# Patient Record
Sex: MALE | Race: WHITE | Employment: OTHER | URBAN - METROPOLITAN AREA
[De-identification: names, ages, dates, MRNs, and addresses within clinical notes are randomized per-mention and may not be internally consistent; named-entity substitution may affect disease eponyms.]

---

## 2018-01-31 ENCOUNTER — APPOINTMENT (OUTPATIENT)
Dept: GENERAL RADIOLOGY | Age: 23
End: 2018-01-31
Attending: PHYSICIAN ASSISTANT
Payer: OTHER GOVERNMENT

## 2018-01-31 ENCOUNTER — HOSPITAL ENCOUNTER (EMERGENCY)
Age: 23
Discharge: HOME | End: 2018-01-31
Attending: EMERGENCY MEDICINE
Payer: OTHER GOVERNMENT

## 2018-01-31 VITALS
HEIGHT: 65 IN | RESPIRATION RATE: 20 BRPM | HEART RATE: 58 BPM | TEMPERATURE: 97.2 F | DIASTOLIC BLOOD PRESSURE: 53 MMHG | WEIGHT: 140 LBS | BODY MASS INDEX: 23.32 KG/M2 | SYSTOLIC BLOOD PRESSURE: 115 MMHG

## 2018-01-31 DIAGNOSIS — M25.561 ACUTE PAIN OF RIGHT KNEE: Primary | ICD-10-CM

## 2018-01-31 PROCEDURE — 73564 X-RAY EXAM KNEE 4 OR MORE: CPT

## 2018-01-31 PROCEDURE — 99282 EMERGENCY DEPT VISIT SF MDM: CPT

## 2018-01-31 RX ORDER — IBUPROFEN 800 MG/1
800 TABLET ORAL
Qty: 20 TAB | Refills: 0 | Status: SHIPPED | OUTPATIENT
Start: 2018-01-31 | End: 2018-02-07

## 2018-01-31 NOTE — LETTER
[facility] [place] 
[facility] 
Sandeep Read [id number] 
[phone] Work/School Note Date: 1/31/2018 To Whom It May concern: 
 
Amberly Muñoz was seen and treated today in the emergency room by the following provider(s): 
Attending Provider: Tenzin Hernandez MD 
Physician Assistant: Lambert Moore. Raymond  Special Instructions:  Wear knee brace constantly for 1 week. Follow up with sick call in the morning. Sincerely, Padmaja Roblero PA-C

## 2018-02-01 NOTE — ED PROVIDER NOTES
EMERGENCY DEPARTMENT HISTORY AND PHYSICAL EXAM    Date: 1/31/2018  Patient Name: Dalton Wynn    History of Presenting Illness     Chief Complaint   Patient presents with    Knee Pain         History Provided By: Patient    Chief Complaint: Right knee pain  Duration: A few months   Timing:  Worsening  Location: Right knee  Quality: Aching  Severity: 5 out of 10  Associated Symptoms: denies any other associated signs or symptoms    Additional History (Context):   10:54 PM  Dalton Wynn is a 23 y.o. male with no PMHX who presents ambulatory to the emergency department C/O right knee pain (rated 5/10), onset a few months ago. Notes no other associated sxs. Pt reports pain has gotten worse with PT. Pt is ac[employer]ive duty [employer]. Pt denies fever, chills, knee swelling, or any other sxs or complaints. PCP: None        Past History     Past Medical History:  History reviewed. No pertinent past medical history. Past Surgical History:  History reviewed. No pertinent surgical history. Family History:  History reviewed. No pertinent family history. Social History:  Social History   Substance Use Topics    Smoking status: Current Some Day Smoker    Smokeless tobacco: Never Used    Alcohol use No       Allergies: Allergies   Allergen Reactions    Penicillins Hives         Review of Systems   Review of Systems   Constitutional: Negative for chills and fever. Musculoskeletal: Positive for arthralgias (right knee). Negative for joint swelling. All other systems reviewed and are negative. Physical Exam     Vitals:    01/31/18 2203 01/31/18 2207   BP: 115/53    Pulse: (!) 58    Resp: 20    Temp: 97.2 °F (36.2 °C)    Weight:  63.5 kg (140 lb)   Height:  5' 5\" (1.651 m)     Physical Exam   Constitutional: He is oriented to person, place, and time. Vital signs are normal. He appears well-developed and well-nourished. No distress. HENT:   Head: Normocephalic and atraumatic.    Eyes: Conjunctivae are normal.   Neck: Normal range of motion. Neck supple. Cardiovascular: Normal rate, regular rhythm and normal heart sounds. Pulmonary/Chest: Effort normal and breath sounds normal. No respiratory distress. Musculoskeletal: Normal range of motion. He exhibits tenderness. He exhibits no edema or deformity. Right knee: He exhibits normal range of motion, no swelling, no effusion, no ecchymosis, no deformity, no laceration, no erythema, normal alignment, no LCL laxity, normal patellar mobility, no bony tenderness, normal meniscus and no MCL laxity. Tenderness (generalized) found. No MCL, no LCL and no patellar tendon tenderness noted. Neurological: He is alert and oriented to person, place, and time. Skin: Skin is warm and dry. He is not diaphoretic. Psychiatric: He has a normal mood and affect. His behavior is normal.   Nursing note and vitals reviewed. Diagnostic Study Results     Labs -   No results found for this or any previous visit (from the past 12 hour(s)). Radiologic Studies -    XR KNEE RT MIN 4 V   Final Result   IMPRESSION:     Negative for acute fracture, dislocation, or significant degenerative changes. As read by the radiologist.     CT Results  (Last 48 hours)    None        CXR Results  (Last 48 hours)    None            Medical Decision Making   I am the first provider for this patient. I reviewed the vital signs, available nursing notes, past medical history, past surgical history, family history and social history. Vital Signs-Reviewed the patient's vital signs. Records Reviewed: Nursing Notes    Provider Notes (Medical Decision Making):     Procedures:  Procedures    ED Course:   10:54 PM   Initial assessment performed. The patients presenting problems have been discussed, and they are in agreement with the care plan formulated and outlined with them. I have encouraged them to ask questions as they arise throughout their visit.     Diagnosis and Disposition DISCHARGE NOTE:  11:10 PM  Raymond Costellos  results have been reviewed with him. He has been counseled regarding his diagnosis, treatment, and plan. He verbally conveys understanding and agreement of the signs, symptoms, diagnosis, treatment and prognosis and additionally agrees to follow up as discussed. He also agrees with the care-plan and conveys that all of his questions have been answered. I have also provided discharge instructions for him that include: educational information regarding their diagnosis and treatment, and list of reasons why they would want to return to the ED prior to their follow-up appointment, should his condition change. He has been provided with education for proper emergency department utilization. CLINICAL IMPRESSION:    1. Acute pain of right knee        PLAN:  1. D/C Home  2. Discharge Medication List as of 1/31/2018 11:07 PM      START taking these medications    Details   ibuprofen (MOTRIN) 800 mg tablet Take 1 Tab by mouth every six (6) hours as needed for Pain for up to 7 days. Indications: Pain, Print, Disp-20 Tab, R-0           3. Follow-up Information     Follow up With Details Comments Contact Info    [facility] Schedule an appointment as soon as possible for a visit in 2 days for PCP follow up [phone]    THE [facility] EMERGENCY DEPT  As needed, If symptoms worsen [address] [place] [id number] [phone]        _______________________________    Attestations: This note is prepared by Henrry Canales, acting as Scribe for Ras Lan PA-C. Ras Lan PA-C:  The scribe's documentation has been prepared under my direction and personally reviewed by me in its entirety.   I confirm that thenote above accurately reflects all work, treatment, procedures, and medical decision making performed by me.  _______________________________

## 2018-02-01 NOTE — DISCHARGE INSTRUCTIONS
Knee Pain or Injury: Care Instructions  Your Care Instructions    Injuries are a common cause of knee problems. Sudden (acute) injuries may be caused by a direct blow to the knee. They can also be caused by abnormal twisting, bending, or falling on the knee. Pain, bruising, or swelling may be severe, and may start within minutes of the injury. Overuse is another cause of knee pain. Other causes are climbing stairs, kneeling, and other activities that use the knee. Everyday wear and tear, especially as you get older, also can cause knee pain. Rest, along with home treatment, often relieves pain and allows your knee to heal. If you have a serious knee injury, you may need tests and treatment. Follow-up care is a key part of your treatment and safety. Be sure to make and go to all appointments, and call your doctor if you are having problems. It's also a good idea to know your test results and keep a list of the medicines you take. How can you care for yourself at home? · Be safe with medicines. Read and follow all instructions on the label. ¨ If the doctor gave you a prescription medicine for pain, take it as prescribed. ¨ If you are not taking a prescription pain medicine, ask your doctor if you can take an over-the-counter medicine. · Rest and protect your knee. Take a break from any activity that may cause pain. · Put ice or a cold pack on your knee for 10 to 20 minutes at a time. Put a thin cloth between the ice and your skin. · Prop up a sore knee on a pillow when you ice it or anytime you sit or lie down for the next 3 days. Try to keep it above the level of your heart. This will help reduce swelling. · If your knee is not swollen, you can put moist heat, a heating pad, or a warm cloth on your knee. · If your doctor recommends an elastic bandage, sleeve, or other type of support for your knee, wear it as directed.   · Follow your doctor's instructions about how much weight you can put on your leg. Use a cane, crutches, or a walker as instructed. · Follow your doctor's instructions about activity during your healing process. If you can do mild exercise, slowly increase your activity. · Reach and stay at a healthy weight. Extra weight can strain the joints, especially the knees and hips, and make the pain worse. Losing even a few pounds may help. When should you call for help? Call 911 anytime you think you may need emergency care. For example, call if:  ? · You have symptoms of a blood clot in your lung (called a pulmonary embolism). These may include:  ¨ Sudden chest pain. ¨ Trouble breathing. ¨ Coughing up blood. ?Call your doctor now or seek immediate medical care if:  ? · You have severe or increasing pain. ? · Your leg or foot turns cold or changes color. ? · You cannot stand or put weight on your knee. ? · Your knee looks twisted or bent out of shape. ? · You cannot move your knee. ? · You have signs of infection, such as:  ¨ Increased pain, swelling, warmth, or redness. ¨ Red streaks leading from the knee. ¨ Pus draining from a place on your knee. ¨ A fever. ? · You have signs of a blood clot in your leg (called a deep vein thrombosis), such as:  ¨ Pain in your calf, back of the knee, thigh, or groin. ¨ Redness and swelling in your leg or groin. ? Watch closely for changes in your health, and be sure to contact your doctor if:  ? · You have tingling, weakness, or numbness in your knee. ? · You have any new symptoms, such as swelling. ? · You have bruises from a knee injury that last longer than 2 weeks. ? · You do not get better as expected. Where can you learn more? Go to http://aki-jacek.info/. Enter K195 in the search box to learn more about \"Knee Pain or Injury: Care Instructions. \"  Current as of: March 20, 2017  Content Version: 11.4  © 2109-9546 [employer].  Care instructions adapted under license by [employer] (which disclaims liability or warranty for this information). If you have questions about a medical condition or this instruction, always ask your healthcare professional. [address] any warranty or liability for your use of this information.

## 2018-02-01 NOTE — ED TRIAGE NOTES
Pt with complaints of right knee pain for a few months but has gotten worse with PT training. Denies trauma. No swelling.